# Patient Record
Sex: FEMALE | Race: WHITE
[De-identification: names, ages, dates, MRNs, and addresses within clinical notes are randomized per-mention and may not be internally consistent; named-entity substitution may affect disease eponyms.]

---

## 2017-11-28 ENCOUNTER — HOSPITAL ENCOUNTER (OUTPATIENT)
Dept: HOSPITAL 80 - FIMAGING | Age: 47
End: 2017-11-28
Attending: OBSTETRICS & GYNECOLOGY
Payer: COMMERCIAL

## 2017-11-28 DIAGNOSIS — Z97.5: ICD-10-CM

## 2017-11-28 DIAGNOSIS — N93.9: Primary | ICD-10-CM

## 2017-12-13 ENCOUNTER — HOSPITAL ENCOUNTER (OUTPATIENT)
Dept: HOSPITAL 80 - FIMAGING | Age: 47
End: 2017-12-13
Attending: OBSTETRICS & GYNECOLOGY
Payer: COMMERCIAL

## 2017-12-13 DIAGNOSIS — Z12.31: Primary | ICD-10-CM

## 2017-12-13 PROCEDURE — G0202 SCR MAMMO BI INCL CAD: HCPCS

## 2017-12-22 ENCOUNTER — HOSPITAL ENCOUNTER (OUTPATIENT)
Dept: HOSPITAL 80 - BRMIMAGING | Age: 47
End: 2017-12-22
Attending: OBSTETRICS & GYNECOLOGY
Payer: COMMERCIAL

## 2017-12-22 DIAGNOSIS — R92.8: Primary | ICD-10-CM

## 2018-01-08 ENCOUNTER — HOSPITAL ENCOUNTER (OUTPATIENT)
Dept: HOSPITAL 80 - FIMAGING | Age: 48
End: 2018-01-08
Attending: OBSTETRICS & GYNECOLOGY
Payer: COMMERCIAL

## 2018-01-08 DIAGNOSIS — D24.1: Primary | ICD-10-CM

## 2018-01-08 PROCEDURE — 0HBT3ZX EXCISION OF RIGHT BREAST, PERCUTANEOUS APPROACH, DIAGNOSTIC: ICD-10-PCS | Performed by: RADIOLOGY

## 2018-04-03 ENCOUNTER — HOSPITAL ENCOUNTER (EMERGENCY)
Dept: HOSPITAL 80 - FED | Age: 48
Discharge: HOME | End: 2018-04-03
Payer: COMMERCIAL

## 2018-04-03 VITALS
SYSTOLIC BLOOD PRESSURE: 122 MMHG | HEART RATE: 84 BPM | DIASTOLIC BLOOD PRESSURE: 82 MMHG | RESPIRATION RATE: 18 BRPM | OXYGEN SATURATION: 97 %

## 2018-04-03 VITALS — TEMPERATURE: 97.2 F

## 2018-04-03 DIAGNOSIS — S61.411A: Primary | ICD-10-CM
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
## 2018-04-03 PROCEDURE — 0HQFXZZ REPAIR RIGHT HAND SKIN, EXTERNAL APPROACH: ICD-10-PCS | Performed by: PHYSICIAN ASSISTANT

## 2018-04-03 NOTE — EDPHY
H & P


Time Seen by Provider: 04/03/18 20:26


HPI/ROS: 





CHIEF COMPLAINT:  Right hand injury





HISTORY OF PRESENT ILLNESS:  47-year-old female presents emergency department 

laceration to her right hand.  The patient was using a soda stream and she went 

to loosen it and it exploded and cut her in her right palm.  The incident 

happened just prior to arrival.  She is right-hand dominant.  Denies any other 

trauma or injury.  Unsure if she has a retained foreign body.  She is unsure of 

her last tetanus shot.  She complains of throbbing pain in her right hand.





ROS:  Denies numbness or tingling in her fingers, pain in her right wrist.


Past Medical/Surgical History: 





Orthopedic surgery


Social History: 








Smoking Status: Never smoked


Physical Exam: 





On examination patient has a 5 cm laceration between the webspace of her right 

thumb and index finger.  There is a part of the laceration that extends deep 

into MCP joint.  I do not appreciate any obvious tendon laceration.  Normal 

sensation to light touch with normal 2 point discrimination.  No palpable bony 

tenderness.  She has full range of motion of her fingers although pain 

especially when she tries to move her right thumb.  No active bleeding noted.


Constitutional: 


 Initial Vital Signs











Temperature (C)  36.2 C   04/03/18 19:51


 


Heart Rate  83   04/03/18 19:51


 


Respiratory Rate  16   04/03/18 19:51


 


Blood Pressure  109/75   04/03/18 19:51


 


O2 Sat (%)  99   04/03/18 19:51








 











O2 Delivery Mode               Room Air














Allergies/Adverse Reactions: 


 





No Known Allergies Allergy (Unverified 10/29/13 08:31)


 








Home Medications: 














 Medication  Instructions  Recorded


 


Cephalexin [Keflex] 500 mg PO QID #28 cap 04/03/18














MDM/Departure





- MDM


Imaging Results: 


 Imaging Impressions





Hand X-Ray  04/03/18 20:55


Impression:


 


Soft tissue gas overlying the palm and thenar eminence compatible with 

laceration. No radiopaque foreign bodies in this area, but there is a small 

linear radiopaque density along the radial aspect of the fifth distal phalanx. 


 











Imaging: I viewed and interpreted images myself


Procedures: 





Laceration repair.





Verbal consent was obtained from the patient.  The 5 cm laceration on the right 

hand was anesthetized using 1% lidocaine with epinephrine.  The wound was 

irrigated with saline, draped and explored to its base with a gloved finger.  

There were no deep structures involved.  No obvious tendon injury was 

identified.  The wound was repaired with 4 0 Ethilon, 10 sutures.  The wound 

repair was complex.  The procedure was performed by myself.


Medications Given: 


 








Discontinued Medications





Cephalexin (Keflex 500 Mg Prepack#4)  1 btl TAKEHOME EDNOW ONE


   PRN Reason: Protocol


   Stop: 04/03/18 21:56


   Last Admin: 04/03/18 22:12 Dose:  1 btl


Ibuprofen (Motrin)  600 mg PO EDNOW ONE


   Stop: 04/03/18 21:45


   Last Admin: 04/03/18 21:48 Dose:  600 mg





ED Course/Re-evaluation: 





47-year-old female presents with right hand injury.  X-rays reveal no evidence 

of retained foreign body, however the patient does have gas noted diffusely in 

her right hand.





I spoke with the on-call hand surgeon, Dr. Brigid Ford, who will see this patient 

in follow-up on Thursday, in 2 days.  He recommended Keflex.  He agreed with 

closure of the skin.





See procedure note.





Patient was placed on Keflex.  She was given ibuprofen.  Her tetanus shot was 

updated in the emergency department.





- Depart


Disposition: Home, Routine, Self-Care


Clinical Impression: 


Laceration of right hand


Qualifiers:


 Encounter type: initial encounter Foreign body presence: without foreign body 

Qualified Code(s): S61.411A - Laceration without foreign body of right hand, 

initial encounter





Condition: Good


Instructions:  Cephalexin (By mouth), Care For Your Stitches (ED), Laceration (

ED), Acute Wounds (ED)


Additional Instructions: 


Wound Care Follow-Up:


Removal of sutures in 10 days.  Suture removal is complimentary in 

uncomplicated cases.


Infection or abnormal findings would require reevaluation by the MD.  In that 

case, you may be billed.





Ibuprofen 600mg every 8 hours for pain as directed.  Keflex 500 mg 4 times 

daily for 1 week to prevent infection.  Keep splint on until follow-up with 

orthopedic hand surgeon on Thursday, in 2 days.





Your given a tetanus shot today in the emergency department.  Please document 

this for your records.


Prescriptions: 


Cephalexin [Keflex] 500 mg PO QID #28 cap


Referrals: 


Brigid Ford MD [Medical Doctor] - 1-2 days without fail (Orthopedic hand 

surgeon on-call)